# Patient Record
Sex: FEMALE | Race: WHITE | NOT HISPANIC OR LATINO | Employment: OTHER | URBAN - METROPOLITAN AREA
[De-identification: names, ages, dates, MRNs, and addresses within clinical notes are randomized per-mention and may not be internally consistent; named-entity substitution may affect disease eponyms.]

---

## 2018-08-14 NOTE — PATIENT DISCUSSION
Patient educated with the Basic + option, they will most likely need prescription glasses at all focal points after surgery. Patient aware she does have a fair amount of astigmatism in both eyes which will most likely mean glasses full time after surgery even if she does not wear them full time now. Patient elects Basic + OS, goal of emmetropia.

## 2018-08-14 NOTE — PATIENT DISCUSSION
Recommend that she begin Alphagan P OU BID now to lower the eye pressure which will make the surgery safer. Diamox with surgery PACU and QHS OU. Patient advised that cataract surgery does tend to lower eye pressure so she may not need to be on Alphagan long-term. Will re-evaluation IOP with Dr. Marcel Matos after surgery.

## 2018-08-14 NOTE — PATIENT DISCUSSION
Narrow but non-occludable according to OCT. Patient aware that this should resolve with cataract surgery.

## 2018-08-20 NOTE — PATIENT DISCUSSION
Recommend that she begin Alphagan P OU BID now to lower the eye pressure which will make the surgery safer. Diamox with surgery PACU and QHS OU. Patient advised that cataract surgery does tend to lower eye pressure so she may not need to be on Alphagan long-term. Will re-evaluation IOP with Dr. Shanita Scherer after surgery.

## 2018-08-21 NOTE — PATIENT DISCUSSION
Recommend that she begin Alphagan P OU BID now to lower the eye pressure which will make the surgery safer. Diamox with surgery PACU and QHS OU. Patient advised that cataract surgery does tend to lower eye pressure so she may not need to be on Alphagan long-term. Will re-evaluation IOP with Dr. Jay Morse after surgery.

## 2018-08-28 NOTE — PATIENT DISCUSSION
Recommend that she begin Alphagan P OU BID now to lower the eye pressure which will make the surgery safer. Diamox with surgery PACU and QHS OU. Patient advised that cataract surgery does tend to lower eye pressure so she may not need to be on Alphagan long-term. Will re-evaluation IOP with Dr. Marichuy Pan after surgery.

## 2019-10-01 ENCOUNTER — NEW PATIENT EMERGENCY (OUTPATIENT)
Dept: URBAN - METROPOLITAN AREA CLINIC 38 | Facility: CLINIC | Age: 69
End: 2019-10-01

## 2019-10-01 DIAGNOSIS — H04.123: ICD-10-CM

## 2019-10-01 DIAGNOSIS — H25.813: ICD-10-CM

## 2019-10-01 DIAGNOSIS — H43.812: ICD-10-CM

## 2019-10-01 DIAGNOSIS — H11.153: ICD-10-CM

## 2019-10-01 PROCEDURE — 99203 OFFICE O/P NEW LOW 30 MIN: CPT

## 2019-10-01 ASSESSMENT — VISUAL ACUITY
OD_CC: J1
OD_CC: 20/20-1
OS_CC: J1
OS_CC: 20/20-2

## 2019-10-01 ASSESSMENT — TONOMETRY
OD_IOP_MMHG: 15
OS_IOP_MMHG: 14

## 2019-10-28 ENCOUNTER — DILATED FUNDUS EXAM (OUTPATIENT)
Dept: URBAN - METROPOLITAN AREA CLINIC 38 | Facility: CLINIC | Age: 69
End: 2019-10-28

## 2019-10-28 DIAGNOSIS — H43.812: ICD-10-CM

## 2019-10-28 PROCEDURE — 92012 INTRM OPH EXAM EST PATIENT: CPT

## 2019-10-28 ASSESSMENT — VISUAL ACUITY
OD_CC: 20/20-2
OS_CC: J1
OS_CC: 20/20-2
OD_CC: J1

## 2019-10-28 ASSESSMENT — TONOMETRY
OS_IOP_MMHG: 15
OD_IOP_MMHG: 14

## 2021-10-28 ENCOUNTER — ESTABLISHED COMPREHENSIVE EXAM (OUTPATIENT)
Dept: URBAN - METROPOLITAN AREA CLINIC 35 | Facility: CLINIC | Age: 71
End: 2021-10-28

## 2021-10-28 DIAGNOSIS — H25.813: ICD-10-CM

## 2021-10-28 DIAGNOSIS — H43.812: ICD-10-CM

## 2021-10-28 DIAGNOSIS — H11.153: ICD-10-CM

## 2021-10-28 DIAGNOSIS — H52.03: ICD-10-CM

## 2021-10-28 DIAGNOSIS — H35.361: ICD-10-CM

## 2021-10-28 DIAGNOSIS — H04.123: ICD-10-CM

## 2021-10-28 PROCEDURE — 92134 CPTRZ OPH DX IMG PST SGM RTA: CPT

## 2021-10-28 PROCEDURE — 92014 COMPRE OPH EXAM EST PT 1/>: CPT

## 2021-10-28 PROCEDURE — 92015 DETERMINE REFRACTIVE STATE: CPT

## 2021-10-28 ASSESSMENT — TONOMETRY
OS_IOP_MMHG: 15
OD_IOP_MMHG: 15

## 2021-10-28 ASSESSMENT — VISUAL ACUITY
OS_CC: 20/25
OD_CC: 20/30-1
OS_CC: J1
OD_CC: J1

## 2022-04-18 NOTE — PATIENT DISCUSSION
Attempted to contact patient with . No answer. VM not set up. Was not able to leave vm   Discussed the importance of good blood pressure control.

## 2022-11-01 ENCOUNTER — COMPREHENSIVE EXAM (OUTPATIENT)
Dept: URBAN - METROPOLITAN AREA CLINIC 35 | Facility: CLINIC | Age: 72
End: 2022-11-01

## 2022-11-01 DIAGNOSIS — H25.813: ICD-10-CM

## 2022-11-01 DIAGNOSIS — H43.812: ICD-10-CM

## 2022-11-01 DIAGNOSIS — H52.03: ICD-10-CM

## 2022-11-01 DIAGNOSIS — H35.361: ICD-10-CM

## 2022-11-01 DIAGNOSIS — H11.153: ICD-10-CM

## 2022-11-01 DIAGNOSIS — H04.123: ICD-10-CM

## 2022-11-01 PROCEDURE — 92015 DETERMINE REFRACTIVE STATE: CPT

## 2022-11-01 PROCEDURE — 92250 FUNDUS PHOTOGRAPHY W/I&R: CPT

## 2022-11-01 PROCEDURE — 92014 COMPRE OPH EXAM EST PT 1/>: CPT

## 2022-11-01 RX ORDER — OLOPATADINE HYDROCHLORIDE 2 MG/ML: 1 SOLUTION OPHTHALMIC ONCE A DAY

## 2022-11-01 ASSESSMENT — TONOMETRY
OD_IOP_MMHG: 11
OS_IOP_MMHG: 11

## 2022-11-01 ASSESSMENT — VISUAL ACUITY
OD_CC: J3
OU_CC: 20/25-1
OS_CC: 20/30
OU_CC: J2
OS_CC: J3
OD_CC: 20/25-2

## 2023-08-15 ENCOUNTER — EMERGENCY VISIT (OUTPATIENT)
Dept: URBAN - METROPOLITAN AREA CLINIC 35 | Facility: CLINIC | Age: 73
End: 2023-08-15

## 2023-08-15 DIAGNOSIS — H04.123: ICD-10-CM

## 2023-08-15 DIAGNOSIS — H01.021: ICD-10-CM

## 2023-08-15 DIAGNOSIS — H01.024: ICD-10-CM

## 2023-08-15 DIAGNOSIS — H10.45: ICD-10-CM

## 2023-08-15 DIAGNOSIS — H25.813: ICD-10-CM

## 2023-08-15 PROCEDURE — 99213 OFFICE O/P EST LOW 20 MIN: CPT

## 2023-08-15 ASSESSMENT — TONOMETRY
OS_IOP_MMHG: 15
OD_IOP_MMHG: 15

## 2023-08-15 ASSESSMENT — VISUAL ACUITY
OS_CC: 20/30-1
OD_CC: 20/30-1

## 2023-09-05 ENCOUNTER — FOLLOW UP (OUTPATIENT)
Dept: URBAN - METROPOLITAN AREA CLINIC 35 | Facility: CLINIC | Age: 73
End: 2023-09-05

## 2023-09-05 DIAGNOSIS — H25.813: ICD-10-CM

## 2023-09-05 DIAGNOSIS — H01.024: ICD-10-CM

## 2023-09-05 DIAGNOSIS — H10.45: ICD-10-CM

## 2023-09-05 DIAGNOSIS — H04.123: ICD-10-CM

## 2023-09-05 DIAGNOSIS — H01.021: ICD-10-CM

## 2023-09-05 PROCEDURE — 92012 INTRM OPH EXAM EST PATIENT: CPT

## 2023-09-05 ASSESSMENT — VISUAL ACUITY
OS_CC: 20/30
OD_CC: 20/25

## 2023-11-01 ENCOUNTER — COMPREHENSIVE EXAM (OUTPATIENT)
Dept: URBAN - METROPOLITAN AREA CLINIC 35 | Facility: CLINIC | Age: 73
End: 2023-11-01

## 2023-11-01 DIAGNOSIS — H04.123: ICD-10-CM

## 2023-11-01 DIAGNOSIS — H52.4: ICD-10-CM

## 2023-11-01 DIAGNOSIS — H10.45: ICD-10-CM

## 2023-11-01 DIAGNOSIS — H25.813: ICD-10-CM

## 2023-11-01 DIAGNOSIS — H01.021: ICD-10-CM

## 2023-11-01 DIAGNOSIS — H01.024: ICD-10-CM

## 2023-11-01 DIAGNOSIS — H11.153: ICD-10-CM

## 2023-11-01 PROCEDURE — 92014 COMPRE OPH EXAM EST PT 1/>: CPT

## 2023-11-01 PROCEDURE — 92015 DETERMINE REFRACTIVE STATE: CPT

## 2023-11-01 ASSESSMENT — VISUAL ACUITY
OD_CC: J3
OS_CC: J2
OD_CC: 20/30-2
OS_CC: 20/30+2
OU_CC: 20/25-2
OU_CC: J2

## 2023-11-01 ASSESSMENT — TONOMETRY
OD_IOP_MMHG: 12
OS_IOP_MMHG: 13

## 2024-01-10 NOTE — PATIENT DISCUSSION
Amvisc + OU, Diamox OU PACU and QHS. Ok to send letter to school excusing patient today. The cream can cause irritation and burning with the first several applications. It typically feels better after a couple applications. It is ok to take allergy medicine daily (zyrtec or Claritin). Winter/dry weather can worsen dermatitis.

## 2024-11-06 ENCOUNTER — PREPPED CHART (OUTPATIENT)
Dept: URBAN - METROPOLITAN AREA CLINIC 35 | Facility: CLINIC | Age: 74
End: 2024-11-06

## 2024-12-09 ENCOUNTER — COMPREHENSIVE EXAM (OUTPATIENT)
Age: 74
End: 2024-12-09

## 2024-12-09 DIAGNOSIS — H01.004: ICD-10-CM

## 2024-12-09 DIAGNOSIS — H04.123: ICD-10-CM

## 2024-12-09 DIAGNOSIS — H10.45: ICD-10-CM

## 2024-12-09 DIAGNOSIS — B88.0: ICD-10-CM

## 2024-12-09 DIAGNOSIS — H52.4: ICD-10-CM

## 2024-12-09 DIAGNOSIS — H01.001: ICD-10-CM

## 2024-12-09 DIAGNOSIS — H25.813: ICD-10-CM

## 2024-12-09 PROCEDURE — 92015 DETERMINE REFRACTIVE STATE: CPT

## 2024-12-09 PROCEDURE — 92014 COMPRE OPH EXAM EST PT 1/>: CPT

## 2025-02-04 ENCOUNTER — FOLLOW UP (OUTPATIENT)
Age: 75
End: 2025-02-04

## 2025-02-04 DIAGNOSIS — B88.0: ICD-10-CM

## 2025-02-04 DIAGNOSIS — H01.001: ICD-10-CM

## 2025-02-04 DIAGNOSIS — H01.004: ICD-10-CM

## 2025-02-04 PROCEDURE — 92012 INTRM OPH EXAM EST PATIENT: CPT

## 2025-07-10 ENCOUNTER — FOLLOW UP (OUTPATIENT)
Age: 75
End: 2025-07-10

## 2025-07-10 DIAGNOSIS — H25.813: ICD-10-CM

## 2025-07-10 DIAGNOSIS — H10.45: ICD-10-CM

## 2025-07-10 DIAGNOSIS — H01.001: ICD-10-CM

## 2025-07-10 DIAGNOSIS — H01.004: ICD-10-CM

## 2025-07-10 DIAGNOSIS — H04.123: ICD-10-CM

## 2025-07-10 DIAGNOSIS — B88.0: ICD-10-CM

## 2025-07-10 PROCEDURE — 92012 INTRM OPH EXAM EST PATIENT: CPT

## 2025-07-10 RX ORDER — PERFLUOROHEXYLOCTANE 1 MG/MG
1 SOLUTION OPHTHALMIC
Start: 2025-07-10